# Patient Record
Sex: MALE | URBAN - METROPOLITAN AREA
[De-identification: names, ages, dates, MRNs, and addresses within clinical notes are randomized per-mention and may not be internally consistent; named-entity substitution may affect disease eponyms.]

---

## 2018-05-02 ENCOUNTER — APPOINTMENT (RX ONLY)
Dept: URBAN - METROPOLITAN AREA CLINIC 38 | Facility: CLINIC | Age: 58
Setting detail: DERMATOLOGY
End: 2018-05-02

## 2018-05-02 DIAGNOSIS — L20.89 OTHER ATOPIC DERMATITIS: ICD-10-CM

## 2018-05-02 DIAGNOSIS — L08.9 LOCAL INFECTION OF THE SKIN AND SUBCUTANEOUS TISSUE, UNSPECIFIED: ICD-10-CM

## 2018-05-02 DIAGNOSIS — L29.9 PRURITUS, UNSPECIFIED: ICD-10-CM

## 2018-05-02 PROBLEM — I10 ESSENTIAL (PRIMARY) HYPERTENSION: Status: ACTIVE | Noted: 2018-05-02

## 2018-05-02 PROBLEM — L20.84 INTRINSIC (ALLERGIC) ECZEMA: Status: ACTIVE | Noted: 2018-05-02

## 2018-05-02 PROBLEM — L85.3 XEROSIS CUTIS: Status: ACTIVE | Noted: 2018-05-02

## 2018-05-02 PROCEDURE — 99203 OFFICE O/P NEW LOW 30 MIN: CPT

## 2018-05-02 RX ORDER — MUPIROCIN 20 MG/G
OINTMENT TOPICAL
Qty: 22 | Refills: 0 | Status: ERX | COMMUNITY
Start: 2018-05-02

## 2018-05-02 RX ORDER — FLUOCINONIDE 0.5 MG/G
CREAM TOPICAL
Qty: 60 | Refills: 1 | Status: ERX | COMMUNITY
Start: 2018-05-02

## 2018-05-02 RX ADMIN — FLUOCINONIDE: 0.5 CREAM TOPICAL at 13:56

## 2018-05-02 RX ADMIN — MUPIROCIN: 20 OINTMENT TOPICAL at 13:54

## 2018-05-02 ASSESSMENT — SEVERITY ASSESSMENT: SEVERITY: MILD

## 2018-05-02 NOTE — HPI: FREE FORM (INITIAL HISTORY)
How Severe Is Your Skin Condition? (The Patient Describes The Severity Level As....): severe
What Brings You In Today? (This Is An Xx Year Old Patient Who Presents For...): Itching
When Did You First Notice It? (The Patient First Noticed It...): 4 months
Where On Your Body Is It? (Located On...): Left lower leg
Any Associated Symptoms? (The Symptoms Include.....): Itching and painful
What Previous Treatments Have You Tried? (The Patient Has Tried The Following Treatments...): Triamcinolone and clotrimazole/betamethasone- no help
Did Anything Happen To Make You Want To Come In For This Specifically Today? (The Specific Reason That The Patient Came In Today Was Because:): Evaluation and treatment

## 2020-11-12 ENCOUNTER — APPOINTMENT (OUTPATIENT)
Age: 60
Setting detail: DERMATOLOGY
End: 2020-11-12

## 2020-11-12 VITALS — TEMPERATURE: 97.9 F

## 2020-11-12 DIAGNOSIS — L72.8 OTHER FOLLICULAR CYSTS OF THE SKIN AND SUBCUTANEOUS TISSUE: ICD-10-CM

## 2020-11-12 DIAGNOSIS — L73.9 FOLLICULAR DISORDER, UNSPECIFIED: ICD-10-CM

## 2020-11-12 DIAGNOSIS — D485 NEOPLASM OF UNCERTAIN BEHAVIOR OF SKIN: ICD-10-CM

## 2020-11-12 DIAGNOSIS — R21 RASH AND OTHER NONSPECIFIC SKIN ERUPTION: ICD-10-CM

## 2020-11-12 DIAGNOSIS — L663 OTHER SPECIFIED DISEASES OF HAIR AND HAIR FOLLICLES: ICD-10-CM

## 2020-11-12 DIAGNOSIS — L738 OTHER SPECIFIED DISEASES OF HAIR AND HAIR FOLLICLES: ICD-10-CM

## 2020-11-12 PROBLEM — D48.5 NEOPLASM OF UNCERTAIN BEHAVIOR OF SKIN: Status: ACTIVE | Noted: 2020-11-12

## 2020-11-12 PROBLEM — L02.821 FURUNCLE OF HEAD [ANY PART, EXCEPT FACE]: Status: ACTIVE | Noted: 2020-11-12

## 2020-11-12 PROCEDURE — 11102 TANGNTL BX SKIN SINGLE LES: CPT

## 2020-11-12 PROCEDURE — 99203 OFFICE O/P NEW LOW 30 MIN: CPT | Mod: 25

## 2020-11-12 PROCEDURE — ? PRESCRIPTION

## 2020-11-12 PROCEDURE — ? DIAGNOSIS COMMENT

## 2020-11-12 PROCEDURE — ? COUNSELING

## 2020-11-12 PROCEDURE — ? PHE RELATED SUPPLIES PROVIDED/DISPENSED

## 2020-11-12 PROCEDURE — 99072 ADDL SUPL MATRL&STAF TM PHE: CPT

## 2020-11-12 PROCEDURE — ? TREATMENT REGIMEN

## 2020-11-12 PROCEDURE — ? BIOPSY BY SHAVE METHOD

## 2020-11-12 RX ORDER — BETAMETHASONE DIPROPIONATE 0.5 MG/ML
LOTION TOPICAL
Qty: 1 | Refills: 1 | Status: ERX | COMMUNITY
Start: 2020-11-12

## 2020-11-12 RX ORDER — HYDROCORTISONE 25 MG/G
CREAM TOPICAL
Qty: 1 | Refills: 1 | Status: ERX | COMMUNITY
Start: 2020-11-12

## 2020-11-12 RX ADMIN — HYDROCORTISONE: 25 CREAM TOPICAL at 00:00

## 2020-11-12 RX ADMIN — BETAMETHASONE DIPROPIONATE: 0.5 LOTION TOPICAL at 00:00

## 2020-11-12 ASSESSMENT — LOCATION DETAILED DESCRIPTION DERM
LOCATION DETAILED: MID-FRONTAL SCALP
LOCATION DETAILED: PERIANAL SKIN
LOCATION DETAILED: RIGHT POSTERIOR ANKLE
LOCATION DETAILED: RIGHT SUPERIOR MEDIAL UPPER BACK

## 2020-11-12 ASSESSMENT — LOCATION ZONE DERM
LOCATION ZONE: SCALP
LOCATION ZONE: LEG
LOCATION ZONE: ANUS
LOCATION ZONE: TRUNK

## 2020-11-12 ASSESSMENT — LOCATION SIMPLE DESCRIPTION DERM
LOCATION SIMPLE: ANTERIOR SCALP
LOCATION SIMPLE: RIGHT ANKLE
LOCATION SIMPLE: RIGHT UPPER BACK
LOCATION SIMPLE: PERIANAL SKIN

## 2020-11-12 NOTE — PROCEDURE: TREATMENT REGIMEN
Initiate Treatment: -Betamethasone Lotion twice a day for two weeks then twice a day M-F
Abdominal Pain, N/V/D
Initiate Treatment: -Hydrocortisone 2.5% cream twice a day for two weeks then twice a day M-F
Detail Level: Zone

## 2020-11-12 NOTE — PROCEDURE: DIAGNOSIS COMMENT
Comment: perforating dermatosis due to kidney disease vs other dermatosis\\ndid bx today to confirm dx
Comment: Recommended patient to see a general surgeon for further evaluation and treatment
Detail Level: Generalized

## 2020-11-12 NOTE — HPI: RASH
What Type Of Note Output Would You Prefer (Optional)?: Standard Output
Is The Patient Presenting As Previously Scheduled?: Yes
How Severe Is Your Rash?: moderate
Is This A New Presentation, Or A Follow-Up?: Rash
Additional History: Patient saw pcp and was advised to apply previously mentioned otc and hot compress

## 2020-11-12 NOTE — PROCEDURE: BIOPSY BY SHAVE METHOD
Render Path Notes In Note?: No
Electrodesiccation And Curettage Text: The wound bed was treated with electrodesiccation and curettage after the biopsy was performed.
Type Of Destruction Used: Curettage
Post-Care Instructions: I reviewed with the patient in detail post-care instructions. Patient is to keep the biopsy clean, and apply vaseline twice daily until healed.
Cryotherapy Text: The wound bed was treated with cryotherapy after the biopsy was performed.
Billing Type: Third-Party Bill
X Size Of Lesion In Cm: 0
Information: Selecting Yes will display possible errors in your note based on the variables you have selected. This validation is only offered as a suggestion for you. PLEASE NOTE THAT THE VALIDATION TEXT WILL BE REMOVED WHEN YOU FINALIZE YOUR NOTE. IF YOU WANT TO FAX A PRELIMINARY NOTE YOU WILL NEED TO TOGGLE THIS TO 'NO' IF YOU DO NOT WANT IT IN YOUR FAXED NOTE.
Render Post-Care Instructions In Note?: yes
Silver Nitrate Text: The wound bed was treated with silver nitrate after the biopsy was performed.
Curettage Text: The wound bed was treated with curettage after the biopsy was performed.
Notification Instructions: Patient will be notified of biopsy results. However, patient instructed to call the office if not contacted within 2-3 weeks.
Hemostasis: Aluminum Chloride and Electrocautery
Consent: Verbal consent was obtained and risks were reviewed including but not limited to pain, dyschromia, scarring, infection, bleeding, incomplete removal, recurrence, and additional procedures.
Dressing: bandage
Biopsy Type: H and E
Anesthesia Type: 2% lidocaine with epinephrine and a 1:10 solution of 8.4% sodium bicarbonate
Biopsy Method: Dermablade
Detail Level: Detailed
Electrodesiccation Text: The wound bed was treated with electrodesiccation after the biopsy was performed.
Depth Of Biopsy: dermis
Wound Care: Vaseline

## 2021-10-07 ENCOUNTER — APPOINTMENT (OUTPATIENT)
Age: 61
Setting detail: DERMATOLOGY
End: 2021-10-07

## 2021-10-07 DIAGNOSIS — L82.0 INFLAMED SEBORRHEIC KERATOSIS: ICD-10-CM

## 2021-10-07 DIAGNOSIS — L72.8 OTHER FOLLICULAR CYSTS OF THE SKIN AND SUBCUTANEOUS TISSUE: ICD-10-CM

## 2021-10-07 PROCEDURE — ? DIAGNOSIS COMMENT

## 2021-10-07 PROCEDURE — ? COUNSELING

## 2021-10-07 PROCEDURE — 99213 OFFICE O/P EST LOW 20 MIN: CPT | Mod: 25

## 2021-10-07 PROCEDURE — ? PRESCRIPTION MEDICATION MANAGEMENT

## 2021-10-07 PROCEDURE — ? PRESCRIPTION

## 2021-10-07 PROCEDURE — ? LIQUID NITROGEN

## 2021-10-07 PROCEDURE — 17110 DESTRUCTION B9 LES UP TO 14: CPT

## 2021-10-07 RX ORDER — MUPIROCIN 20 MG/G
OINTMENT TOPICAL BID
Qty: 22 | Refills: 1 | Status: ERX | COMMUNITY
Start: 2021-10-07

## 2021-10-07 RX ADMIN — MUPIROCIN: 20 OINTMENT TOPICAL at 00:00

## 2021-10-07 ASSESSMENT — LOCATION DETAILED DESCRIPTION DERM
LOCATION DETAILED: PERIANAL SKIN
LOCATION DETAILED: RIGHT LATERAL POSTERIOR ANKLE

## 2021-10-07 ASSESSMENT — LOCATION SIMPLE DESCRIPTION DERM
LOCATION SIMPLE: RIGHT ANKLE
LOCATION SIMPLE: PERIANAL SKIN

## 2021-10-07 ASSESSMENT — LOCATION ZONE DERM
LOCATION ZONE: ANUS
LOCATION ZONE: LEG

## 2021-10-07 NOTE — PROCEDURE: DIAGNOSIS COMMENT
Comment: subQ nodule. favor cyst, but ddx in perineum is large. if area worsens or doesn't improve, pt will call us or pcp
Detail Level: Generalized
Render Risk Assessment In Note?: no
Comment: DDX: Peripheral Dermatosis

## 2021-10-07 NOTE — PROCEDURE: PRESCRIPTION MEDICATION MANAGEMENT
Initiate Treatment: -Mupirocin ointment twice a day prn
Detail Level: Zone
Render In Strict Bullet Format?: No

## 2021-10-07 NOTE — PROCEDURE: LIQUID NITROGEN
Show Topical Anesthesia Variable?: Yes
Medical Necessity Information: It is in your best interest to select a reason for this procedure from the list below. All of these items fulfill various CMS LCD requirements except the new and changing color options.
Render Post-Care Instructions In Note?: no
Detail Level: Detailed
Medical Necessity Clause: This procedure was medically necessary because the lesions that were treated were:
Post-Care Instructions: I reviewed with the patient in detail post-care instructions. Patient is to wear sunprotection, and avoid picking at any of the treated lesions. Pt may apply Vaseline to crusted or scabbing areas.
Consent: The patient's consent was obtained including but not limited to risk of pain, bleeding, pigmentary change, infection, inadequate response, recurrence, additional procedures, and scarring.

## 2022-02-24 ENCOUNTER — PREPPED CHART (OUTPATIENT)
Dept: URBAN - METROPOLITAN AREA CLINIC 89 | Facility: CLINIC | Age: 62
End: 2022-02-24

## 2022-02-24 PROBLEM — E11.3511 DIABETIC MACULAR EDEMA: Noted: 2022-02-24

## 2022-02-24 PROBLEM — H35.033 HYPERTENSIVE RETINOPATHY: Noted: 2022-02-24

## 2022-02-24 PROBLEM — Z79.4 DIABETIC MACULAR EDEMA: Noted: 2022-02-24

## 2022-02-24 PROBLEM — H43.811 POSTERIOR VITREOUS DETACHMENT: Noted: 2022-02-24

## 2022-02-24 PROBLEM — Z79.4 SEVERE NONPROLIFERATIVE DIABETIC RETINOPATHY: Noted: 2022-02-24

## 2022-02-24 PROBLEM — E11.3413 SEVERE NONPROLIFERATIVE DIABETIC RETINOPATHY: Noted: 2022-02-24

## 2022-02-24 PROBLEM — E11.3412 SEVERE NONPROLIFERATIVE DIABETIC RETINOPATHY: Noted: 2022-02-24

## 2022-02-24 PROBLEM — E11.3413 DIABETIC MACULAR EDEMA: Noted: 2022-02-24

## 2022-02-24 PROBLEM — E11.3513 DIABETIC MACULAR EDEMA: Noted: 2022-02-24

## 2022-03-22 ASSESSMENT — VISUAL ACUITY
OS_SC: 20/40 -1
OD_PH: 20/40 +2
OD_SC: 20/40 -2

## 2022-03-22 ASSESSMENT — TONOMETRY
OS_IOP_MMHG: 14
OD_IOP_MMHG: 15

## 2022-03-28 ENCOUNTER — FOLLOW UP (OUTPATIENT)
Dept: URBAN - METROPOLITAN AREA CLINIC 89 | Facility: CLINIC | Age: 62
End: 2022-03-28

## 2022-03-28 DIAGNOSIS — H43.811: ICD-10-CM

## 2022-03-28 DIAGNOSIS — E11.3413: ICD-10-CM

## 2022-03-28 DIAGNOSIS — Z79.4: ICD-10-CM

## 2022-03-28 DIAGNOSIS — H35.033: ICD-10-CM

## 2022-03-28 PROCEDURE — 67028 INJECTION EYE DRUG: CPT | Mod: 50

## 2022-03-28 PROCEDURE — 92134 CPTRZ OPH DX IMG PST SGM RTA: CPT

## 2022-03-28 PROCEDURE — 92202 OPSCPY EXTND ON/MAC DRAW: CPT | Mod: NC

## 2022-03-28 PROCEDURE — 92014 COMPRE OPH EXAM EST PT 1/>: CPT | Mod: 25

## 2022-03-28 ASSESSMENT — VISUAL ACUITY
OS_SC: 20/40
OD_SC: 20/50-1

## 2022-03-28 ASSESSMENT — TONOMETRY
OD_IOP_MMHG: 13
OS_IOP_MMHG: 13

## 2022-04-25 ENCOUNTER — FOLLOW UP (OUTPATIENT)
Dept: URBAN - METROPOLITAN AREA CLINIC 89 | Facility: CLINIC | Age: 62
End: 2022-04-25

## 2022-04-25 DIAGNOSIS — Z79.4: ICD-10-CM

## 2022-04-25 DIAGNOSIS — H43.811: ICD-10-CM

## 2022-04-25 DIAGNOSIS — H35.033: ICD-10-CM

## 2022-04-25 DIAGNOSIS — E11.3413: ICD-10-CM

## 2022-04-25 PROCEDURE — 92014 COMPRE OPH EXAM EST PT 1/>: CPT | Mod: 25

## 2022-04-25 PROCEDURE — 67028 INJECTION EYE DRUG: CPT

## 2022-04-25 PROCEDURE — 92134 CPTRZ OPH DX IMG PST SGM RTA: CPT

## 2022-04-25 PROCEDURE — PFS EYLEA PFS

## 2022-04-25 PROCEDURE — 92202 OPSCPY EXTND ON/MAC DRAW: CPT | Mod: NC

## 2022-04-25 ASSESSMENT — TONOMETRY
OD_IOP_MMHG: 13
OS_IOP_MMHG: 14

## 2022-04-25 ASSESSMENT — VISUAL ACUITY
OD_SC: 20/40-2
OS_SC: 20/40

## 2022-05-23 ENCOUNTER — FOLLOW UP (OUTPATIENT)
Dept: URBAN - METROPOLITAN AREA CLINIC 89 | Facility: CLINIC | Age: 62
End: 2022-05-23

## 2022-05-23 DIAGNOSIS — H35.033: ICD-10-CM

## 2022-05-23 DIAGNOSIS — Z79.4: ICD-10-CM

## 2022-05-23 DIAGNOSIS — E11.3413: ICD-10-CM

## 2022-05-23 DIAGNOSIS — Z96.1: ICD-10-CM

## 2022-05-23 DIAGNOSIS — H43.811: ICD-10-CM

## 2022-05-23 PROCEDURE — 92202 OPSCPY EXTND ON/MAC DRAW: CPT | Mod: NC

## 2022-05-23 PROCEDURE — PFS EYLEA PFS

## 2022-05-23 PROCEDURE — 92014 COMPRE OPH EXAM EST PT 1/>: CPT | Mod: 25

## 2022-05-23 PROCEDURE — 92134 CPTRZ OPH DX IMG PST SGM RTA: CPT

## 2022-05-23 PROCEDURE — 67028 INJECTION EYE DRUG: CPT | Mod: 50

## 2022-05-23 ASSESSMENT — VISUAL ACUITY
OS_SC: 20/50-1
OD_SC: 20/40-1

## 2022-05-23 ASSESSMENT — TONOMETRY
OD_IOP_MMHG: 13
OS_IOP_MMHG: 14

## 2022-06-27 ENCOUNTER — FOLLOW UP (OUTPATIENT)
Dept: URBAN - METROPOLITAN AREA CLINIC 89 | Facility: CLINIC | Age: 62
End: 2022-06-27

## 2022-06-27 DIAGNOSIS — H43.811: ICD-10-CM

## 2022-06-27 DIAGNOSIS — H35.033: ICD-10-CM

## 2022-06-27 DIAGNOSIS — E11.3413: ICD-10-CM

## 2022-06-27 PROCEDURE — 92134 CPTRZ OPH DX IMG PST SGM RTA: CPT

## 2022-06-27 PROCEDURE — 92014 COMPRE OPH EXAM EST PT 1/>: CPT | Mod: 25

## 2022-06-27 PROCEDURE — 92202 OPSCPY EXTND ON/MAC DRAW: CPT | Mod: NC

## 2022-06-27 PROCEDURE — 67028 INJECTION EYE DRUG: CPT | Mod: 50

## 2022-06-27 ASSESSMENT — VISUAL ACUITY
OD_SC: 20/40
OS_SC: 20/50-2

## 2022-06-27 ASSESSMENT — TONOMETRY
OD_IOP_MMHG: 11
OS_IOP_MMHG: 9

## 2022-08-08 ENCOUNTER — FOLLOW UP (OUTPATIENT)
Dept: URBAN - METROPOLITAN AREA CLINIC 89 | Facility: CLINIC | Age: 62
End: 2022-08-08

## 2022-08-08 DIAGNOSIS — E11.3413: ICD-10-CM

## 2022-08-08 DIAGNOSIS — H35.033: ICD-10-CM

## 2022-08-08 DIAGNOSIS — H43.811: ICD-10-CM

## 2022-08-08 DIAGNOSIS — Z79.4: ICD-10-CM

## 2022-08-08 PROCEDURE — 92134 CPTRZ OPH DX IMG PST SGM RTA: CPT

## 2022-08-08 PROCEDURE — PFS EYLEA PFS

## 2022-08-08 PROCEDURE — 67028 INJECTION EYE DRUG: CPT | Mod: 50

## 2022-08-08 PROCEDURE — 92202 OPSCPY EXTND ON/MAC DRAW: CPT | Mod: NC

## 2022-08-08 PROCEDURE — 92014 COMPRE OPH EXAM EST PT 1/>: CPT | Mod: 25

## 2022-08-08 ASSESSMENT — TONOMETRY
OD_IOP_MMHG: 17
OS_IOP_MMHG: 17

## 2022-08-08 ASSESSMENT — VISUAL ACUITY
OS_SC: 20/50
OD_SC: 20/40

## 2022-09-07 ENCOUNTER — FOLLOW UP (OUTPATIENT)
Dept: URBAN - METROPOLITAN AREA CLINIC 89 | Facility: CLINIC | Age: 62
End: 2022-09-07

## 2022-09-07 DIAGNOSIS — H43.811: ICD-10-CM

## 2022-09-07 DIAGNOSIS — E11.3413: ICD-10-CM

## 2022-09-07 DIAGNOSIS — H35.033: ICD-10-CM

## 2022-09-07 DIAGNOSIS — Z79.4: ICD-10-CM

## 2022-09-07 PROCEDURE — 92014 COMPRE OPH EXAM EST PT 1/>: CPT | Mod: 25

## 2022-09-07 PROCEDURE — PFS EYLEA PFS

## 2022-09-07 PROCEDURE — 92134 CPTRZ OPH DX IMG PST SGM RTA: CPT

## 2022-09-07 PROCEDURE — 67028 INJECTION EYE DRUG: CPT | Mod: 50

## 2022-09-07 PROCEDURE — 92202 OPSCPY EXTND ON/MAC DRAW: CPT | Mod: NC

## 2022-09-07 ASSESSMENT — TONOMETRY
OS_IOP_MMHG: 22
OD_IOP_MMHG: 15

## 2022-09-07 ASSESSMENT — VISUAL ACUITY
OS_SC: 20/40-2
OD_SC: 20/40-2

## 2022-09-27 ENCOUNTER — APPOINTMENT (RX ONLY)
Dept: URBAN - METROPOLITAN AREA CLINIC 38 | Facility: CLINIC | Age: 62
Setting detail: DERMATOLOGY
End: 2022-09-27

## 2022-09-27 DIAGNOSIS — L87.2 ELASTOSIS PERFORANS SERPIGINOSA: ICD-10-CM | Status: WORSENING

## 2022-09-27 PROCEDURE — ? ADDITIONAL NOTES

## 2022-09-27 PROCEDURE — 99204 OFFICE O/P NEW MOD 45 MIN: CPT

## 2022-09-27 PROCEDURE — ? COUNSELING

## 2022-09-27 PROCEDURE — ? PRESCRIPTION

## 2022-09-27 RX ORDER — DESOXIMETASONE 2.5 MG/G
CREAM TOPICAL BID
Qty: 60 | Refills: 3 | Status: ERX | COMMUNITY
Start: 2022-09-27

## 2022-09-27 RX ADMIN — DESOXIMETASONE: 2.5 CREAM TOPICAL at 00:00

## 2022-09-27 ASSESSMENT — LOCATION ZONE DERM: LOCATION ZONE: LEG

## 2022-09-27 ASSESSMENT — LOCATION SIMPLE DESCRIPTION DERM
LOCATION SIMPLE: RIGHT PRETIBIAL REGION
LOCATION SIMPLE: LEFT PRETIBIAL REGION

## 2022-09-27 ASSESSMENT — LOCATION DETAILED DESCRIPTION DERM
LOCATION DETAILED: RIGHT DISTAL PRETIBIAL REGION
LOCATION DETAILED: LEFT DISTAL PRETIBIAL REGION

## 2022-10-13 ENCOUNTER — FOLLOW UP (OUTPATIENT)
Dept: URBAN - METROPOLITAN AREA CLINIC 89 | Facility: CLINIC | Age: 62
End: 2022-10-13

## 2022-10-13 DIAGNOSIS — Z79.4: ICD-10-CM

## 2022-10-13 DIAGNOSIS — H35.033: ICD-10-CM

## 2022-10-13 DIAGNOSIS — E11.3413: ICD-10-CM

## 2022-10-13 DIAGNOSIS — H43.811: ICD-10-CM

## 2022-10-13 PROCEDURE — 92014 COMPRE OPH EXAM EST PT 1/>: CPT | Mod: 25

## 2022-10-13 PROCEDURE — 92134 CPTRZ OPH DX IMG PST SGM RTA: CPT

## 2022-10-13 PROCEDURE — 92202 OPSCPY EXTND ON/MAC DRAW: CPT | Mod: NC

## 2022-10-13 PROCEDURE — 67028 INJECTION EYE DRUG: CPT | Mod: 50

## 2022-10-13 ASSESSMENT — TONOMETRY
OS_IOP_MMHG: 11
OD_IOP_MMHG: 13

## 2022-10-13 ASSESSMENT — VISUAL ACUITY
OS_SC: 20/40-1
OD_SC: 20/30-2
OD_PH: 20/25-2

## 2022-10-26 ENCOUNTER — APPOINTMENT (RX ONLY)
Dept: URBAN - METROPOLITAN AREA CLINIC 38 | Facility: CLINIC | Age: 62
Setting detail: DERMATOLOGY
End: 2022-10-26

## 2022-10-26 DIAGNOSIS — Q80.0 ICHTHYOSIS VULGARIS: ICD-10-CM

## 2022-10-26 PROCEDURE — ? PRESCRIPTION

## 2022-10-26 PROCEDURE — 99213 OFFICE O/P EST LOW 20 MIN: CPT

## 2022-10-26 PROCEDURE — ? ADDITIONAL NOTES

## 2022-10-26 PROCEDURE — ? COUNSELING

## 2022-10-26 RX ORDER — AMMONIUM LACTATE 12 G/100G
CREAM TOPICAL
Qty: 1 | Refills: 3 | Status: ERX | COMMUNITY
Start: 2022-10-26

## 2022-10-26 RX ADMIN — AMMONIUM LACTATE: 12 CREAM TOPICAL at 00:00

## 2022-10-26 ASSESSMENT — LOCATION DETAILED DESCRIPTION DERM
LOCATION DETAILED: LEFT DISTAL PRETIBIAL REGION
LOCATION DETAILED: RIGHT DISTAL PRETIBIAL REGION

## 2022-10-26 ASSESSMENT — LOCATION SIMPLE DESCRIPTION DERM
LOCATION SIMPLE: LEFT PRETIBIAL REGION
LOCATION SIMPLE: RIGHT PRETIBIAL REGION

## 2022-10-26 ASSESSMENT — LOCATION ZONE DERM: LOCATION ZONE: LEG

## 2022-11-17 ENCOUNTER — FOLLOW UP (OUTPATIENT)
Dept: URBAN - METROPOLITAN AREA CLINIC 89 | Facility: CLINIC | Age: 62
End: 2022-11-17

## 2022-11-17 DIAGNOSIS — Z79.4: ICD-10-CM

## 2022-11-17 DIAGNOSIS — H35.033: ICD-10-CM

## 2022-11-17 DIAGNOSIS — H43.811: ICD-10-CM

## 2022-11-17 DIAGNOSIS — E11.3413: ICD-10-CM

## 2022-11-17 PROCEDURE — 92014 COMPRE OPH EXAM EST PT 1/>: CPT | Mod: 25

## 2022-11-17 PROCEDURE — 92134 CPTRZ OPH DX IMG PST SGM RTA: CPT

## 2022-11-17 PROCEDURE — PFS EYLEA PFS

## 2022-11-17 PROCEDURE — 92202 OPSCPY EXTND ON/MAC DRAW: CPT | Mod: NC

## 2022-11-17 PROCEDURE — 67028 INJECTION EYE DRUG: CPT | Mod: 50

## 2022-11-17 ASSESSMENT — TONOMETRY
OS_IOP_MMHG: 11
OD_IOP_MMHG: 13

## 2022-11-17 ASSESSMENT — VISUAL ACUITY
OD_SC: 20/40+1
OS_SC: 20/40+3

## 2022-12-15 ENCOUNTER — FOLLOW UP (OUTPATIENT)
Dept: URBAN - METROPOLITAN AREA CLINIC 89 | Facility: CLINIC | Age: 62
End: 2022-12-15

## 2022-12-15 DIAGNOSIS — H43.811: ICD-10-CM

## 2022-12-15 DIAGNOSIS — E11.3413: ICD-10-CM

## 2022-12-15 DIAGNOSIS — Z79.4: ICD-10-CM

## 2022-12-15 DIAGNOSIS — Z96.1: ICD-10-CM

## 2022-12-15 DIAGNOSIS — H35.033: ICD-10-CM

## 2022-12-15 PROCEDURE — 92014 COMPRE OPH EXAM EST PT 1/>: CPT

## 2022-12-15 PROCEDURE — 92134 CPTRZ OPH DX IMG PST SGM RTA: CPT

## 2022-12-15 PROCEDURE — 92202 OPSCPY EXTND ON/MAC DRAW: CPT

## 2022-12-15 ASSESSMENT — VISUAL ACUITY
OS_SC: 20/40-1
OD_PH: 20/40
OD_SC: 20/40-2

## 2022-12-15 ASSESSMENT — TONOMETRY
OS_IOP_MMHG: 17
OD_IOP_MMHG: 17

## 2023-01-12 ENCOUNTER — FOLLOW UP (OUTPATIENT)
Dept: URBAN - METROPOLITAN AREA CLINIC 89 | Facility: CLINIC | Age: 63
End: 2023-01-12

## 2023-01-12 DIAGNOSIS — H35.033: ICD-10-CM

## 2023-01-12 DIAGNOSIS — Z79.4: ICD-10-CM

## 2023-01-12 DIAGNOSIS — E11.3413: ICD-10-CM

## 2023-01-12 PROCEDURE — 92014 COMPRE OPH EXAM EST PT 1/>: CPT

## 2023-01-12 PROCEDURE — 92134 CPTRZ OPH DX IMG PST SGM RTA: CPT

## 2023-01-12 PROCEDURE — 92201 OPSCPY EXTND RTA DRAW UNI/BI: CPT

## 2023-01-12 ASSESSMENT — TONOMETRY
OS_IOP_MMHG: 13
OD_IOP_MMHG: 15

## 2023-01-12 ASSESSMENT — VISUAL ACUITY
OS_PH: 20/30
OS_SC: 20/40-1
OD_SC: 20/60-2
OD_PH: 20/30-1

## 2023-02-22 ENCOUNTER — FOLLOW UP (OUTPATIENT)
Dept: URBAN - METROPOLITAN AREA CLINIC 89 | Facility: CLINIC | Age: 63
End: 2023-02-22

## 2023-02-22 DIAGNOSIS — H43.811: ICD-10-CM

## 2023-02-22 DIAGNOSIS — H35.033: ICD-10-CM

## 2023-02-22 DIAGNOSIS — E11.3413: ICD-10-CM

## 2023-02-22 PROCEDURE — 92134 CPTRZ OPH DX IMG PST SGM RTA: CPT

## 2023-02-22 PROCEDURE — 92014 COMPRE OPH EXAM EST PT 1/>: CPT

## 2023-02-22 PROCEDURE — 92202 OPSCPY EXTND ON/MAC DRAW: CPT

## 2023-02-22 ASSESSMENT — VISUAL ACUITY
OD_SC: 20/70
OS_SC: 20/50-2

## 2023-02-22 ASSESSMENT — TONOMETRY
OS_IOP_MMHG: 8
OD_IOP_MMHG: 10

## 2023-05-31 ENCOUNTER — FOLLOW UP (OUTPATIENT)
Dept: URBAN - METROPOLITAN AREA CLINIC 89 | Facility: CLINIC | Age: 63
End: 2023-05-31

## 2023-05-31 DIAGNOSIS — H43.811: ICD-10-CM

## 2023-05-31 DIAGNOSIS — H40.89: ICD-10-CM

## 2023-05-31 DIAGNOSIS — H35.033: ICD-10-CM

## 2023-05-31 DIAGNOSIS — H43.11: ICD-10-CM

## 2023-05-31 DIAGNOSIS — E11.3412: ICD-10-CM

## 2023-05-31 DIAGNOSIS — E11.3511: ICD-10-CM

## 2023-05-31 DIAGNOSIS — Z79.4: ICD-10-CM

## 2023-05-31 PROCEDURE — 92014 COMPRE OPH EXAM EST PT 1/>: CPT | Mod: 25

## 2023-05-31 PROCEDURE — 67028 INJECTION EYE DRUG: CPT | Mod: 50

## 2023-05-31 PROCEDURE — 92202 OPSCPY EXTND ON/MAC DRAW: CPT | Mod: NC

## 2023-05-31 PROCEDURE — 92134 CPTRZ OPH DX IMG PST SGM RTA: CPT

## 2023-05-31 PROCEDURE — PFS EYLEA PFS

## 2023-05-31 PROCEDURE — 76512 OPH US DX B-SCAN: CPT

## 2023-05-31 ASSESSMENT — TONOMETRY
OS_IOP_MMHG: 15
OD_IOP_MMHG: 32

## 2023-05-31 ASSESSMENT — VISUAL ACUITY: OS_SC: 20/60

## 2023-06-07 ENCOUNTER — FOLLOW UP (OUTPATIENT)
Dept: URBAN - METROPOLITAN AREA CLINIC 89 | Facility: CLINIC | Age: 63
End: 2023-06-07

## 2023-06-07 DIAGNOSIS — H43.811: ICD-10-CM

## 2023-06-07 DIAGNOSIS — Z79.4: ICD-10-CM

## 2023-06-07 DIAGNOSIS — E11.3412: ICD-10-CM

## 2023-06-07 DIAGNOSIS — E11.3511: ICD-10-CM

## 2023-06-07 DIAGNOSIS — H43.11: ICD-10-CM

## 2023-06-07 DIAGNOSIS — H40.89: ICD-10-CM

## 2023-06-07 DIAGNOSIS — H35.033: ICD-10-CM

## 2023-06-07 PROCEDURE — 92134 CPTRZ OPH DX IMG PST SGM RTA: CPT

## 2023-06-07 PROCEDURE — 92014 COMPRE OPH EXAM EST PT 1/>: CPT

## 2023-06-07 PROCEDURE — 92202 OPSCPY EXTND ON/MAC DRAW: CPT

## 2023-06-07 ASSESSMENT — VISUAL ACUITY: OS_SC: 20/40-1

## 2023-06-07 ASSESSMENT — TONOMETRY
OS_IOP_MMHG: 10
OD_IOP_MMHG: 23

## 2023-09-13 ENCOUNTER — FOLLOW UP (OUTPATIENT)
Dept: URBAN - METROPOLITAN AREA CLINIC 89 | Facility: CLINIC | Age: 63
End: 2023-09-13

## 2023-09-13 DIAGNOSIS — E11.3511: ICD-10-CM

## 2023-09-13 DIAGNOSIS — E11.3412: ICD-10-CM

## 2023-09-13 DIAGNOSIS — H35.033: ICD-10-CM

## 2023-09-13 DIAGNOSIS — H34.8110: ICD-10-CM

## 2023-09-13 DIAGNOSIS — H43.811: ICD-10-CM

## 2023-09-13 DIAGNOSIS — H43.11: ICD-10-CM

## 2023-09-13 DIAGNOSIS — H40.89: ICD-10-CM

## 2023-09-13 DIAGNOSIS — Z79.4: ICD-10-CM

## 2023-09-13 PROCEDURE — 92202 OPSCPY EXTND ON/MAC DRAW: CPT | Mod: NC

## 2023-09-13 PROCEDURE — 92134 CPTRZ OPH DX IMG PST SGM RTA: CPT

## 2023-09-13 PROCEDURE — 67028 INJECTION EYE DRUG: CPT | Mod: 50

## 2023-09-13 PROCEDURE — PFS EYLEA PFS: Mod: JZ

## 2023-09-13 PROCEDURE — 92014 COMPRE OPH EXAM EST PT 1/>: CPT | Mod: 25

## 2023-09-13 ASSESSMENT — TONOMETRY
OD_IOP_MMHG: 14
OS_IOP_MMHG: 13

## 2023-09-13 ASSESSMENT — VISUAL ACUITY: OS_SC: 20/60-2

## 2023-10-11 ENCOUNTER — FOLLOW UP (OUTPATIENT)
Dept: URBAN - METROPOLITAN AREA CLINIC 89 | Facility: CLINIC | Age: 63
End: 2023-10-11

## 2023-10-11 DIAGNOSIS — E11.3412: ICD-10-CM

## 2023-10-11 DIAGNOSIS — H35.033: ICD-10-CM

## 2023-10-11 DIAGNOSIS — H34.8110: ICD-10-CM

## 2023-10-11 DIAGNOSIS — H40.89: ICD-10-CM

## 2023-10-11 DIAGNOSIS — E11.3511: ICD-10-CM

## 2023-10-11 DIAGNOSIS — H43.811: ICD-10-CM

## 2023-10-11 DIAGNOSIS — Z79.4: ICD-10-CM

## 2023-10-11 DIAGNOSIS — H43.11: ICD-10-CM

## 2023-10-11 PROCEDURE — 92134 CPTRZ OPH DX IMG PST SGM RTA: CPT

## 2023-10-11 PROCEDURE — HD EYLEA HD 8MG: Mod: JZ

## 2023-10-11 PROCEDURE — 92014 COMPRE OPH EXAM EST PT 1/>: CPT | Mod: 25

## 2023-10-11 PROCEDURE — 67028 INJECTION EYE DRUG: CPT | Mod: 50

## 2023-10-11 PROCEDURE — 92202 OPSCPY EXTND ON/MAC DRAW: CPT | Mod: NC

## 2023-10-11 ASSESSMENT — VISUAL ACUITY
OS_SC: 20/60+1
OS_PH: 20/50-2
OD_SC: 20/150-1

## 2023-10-11 ASSESSMENT — TONOMETRY
OD_IOP_MMHG: 15
OS_IOP_MMHG: 11

## 2023-11-16 ENCOUNTER — FOLLOW UP (OUTPATIENT)
Dept: URBAN - METROPOLITAN AREA CLINIC 89 | Facility: CLINIC | Age: 63
End: 2023-11-16

## 2023-11-16 DIAGNOSIS — H40.89: ICD-10-CM

## 2023-11-16 DIAGNOSIS — Z79.4: ICD-10-CM

## 2023-11-16 DIAGNOSIS — H43.811: ICD-10-CM

## 2023-11-16 DIAGNOSIS — H34.8110: ICD-10-CM

## 2023-11-16 DIAGNOSIS — E11.3412: ICD-10-CM

## 2023-11-16 DIAGNOSIS — H43.11: ICD-10-CM

## 2023-11-16 DIAGNOSIS — E11.3511: ICD-10-CM

## 2023-11-16 DIAGNOSIS — H35.033: ICD-10-CM

## 2023-11-16 PROCEDURE — 92134 CPTRZ OPH DX IMG PST SGM RTA: CPT

## 2023-11-16 PROCEDURE — 92202 OPSCPY EXTND ON/MAC DRAW: CPT | Mod: NC

## 2023-11-16 PROCEDURE — 92014 COMPRE OPH EXAM EST PT 1/>: CPT | Mod: 25

## 2023-11-16 PROCEDURE — HD EYLEA HD 8MG: Mod: JZ

## 2023-11-16 PROCEDURE — 67028 INJECTION EYE DRUG: CPT | Mod: 50

## 2023-11-16 ASSESSMENT — TONOMETRY
OD_IOP_MMHG: 13
OS_IOP_MMHG: 12

## 2023-11-16 ASSESSMENT — VISUAL ACUITY
OD_SC: 20/100
OS_SC: 20/50-1

## 2023-12-14 ENCOUNTER — FOLLOW UP (OUTPATIENT)
Dept: URBAN - METROPOLITAN AREA CLINIC 89 | Facility: CLINIC | Age: 63
End: 2023-12-14

## 2023-12-14 DIAGNOSIS — Z79.4: ICD-10-CM

## 2023-12-14 DIAGNOSIS — E11.3511: ICD-10-CM

## 2023-12-14 DIAGNOSIS — E11.3412: ICD-10-CM

## 2023-12-14 DIAGNOSIS — H35.033: ICD-10-CM

## 2023-12-14 DIAGNOSIS — H34.8110: ICD-10-CM

## 2023-12-14 DIAGNOSIS — H43.811: ICD-10-CM

## 2023-12-14 DIAGNOSIS — H43.11: ICD-10-CM

## 2023-12-14 PROCEDURE — 92014 COMPRE OPH EXAM EST PT 1/>: CPT | Mod: 25

## 2023-12-14 PROCEDURE — 92134 CPTRZ OPH DX IMG PST SGM RTA: CPT

## 2023-12-14 PROCEDURE — HD EYLEA HD 8MG: Mod: JZ

## 2023-12-14 PROCEDURE — 92202 OPSCPY EXTND ON/MAC DRAW: CPT | Mod: NC

## 2023-12-14 PROCEDURE — 67028 INJECTION EYE DRUG: CPT | Mod: 50

## 2023-12-14 ASSESSMENT — TONOMETRY
OS_IOP_MMHG: 10
OD_IOP_MMHG: 12

## 2023-12-14 ASSESSMENT — VISUAL ACUITY
OS_SC: 20/60-2
OD_SC: 20/100

## 2024-01-17 ENCOUNTER — FOLLOW UP (OUTPATIENT)
Dept: URBAN - METROPOLITAN AREA CLINIC 89 | Facility: CLINIC | Age: 64
End: 2024-01-17

## 2024-01-17 DIAGNOSIS — H43.11: ICD-10-CM

## 2024-01-17 DIAGNOSIS — H43.811: ICD-10-CM

## 2024-01-17 DIAGNOSIS — Z79.4: ICD-10-CM

## 2024-01-17 DIAGNOSIS — H40.89: ICD-10-CM

## 2024-01-17 DIAGNOSIS — E11.3511: ICD-10-CM

## 2024-01-17 DIAGNOSIS — E11.3412: ICD-10-CM

## 2024-01-17 DIAGNOSIS — H35.033: ICD-10-CM

## 2024-01-17 DIAGNOSIS — H34.8110: ICD-10-CM

## 2024-01-17 PROCEDURE — 92202 OPSCPY EXTND ON/MAC DRAW: CPT | Mod: NC

## 2024-01-17 PROCEDURE — 92014 COMPRE OPH EXAM EST PT 1/>: CPT | Mod: 25

## 2024-01-17 PROCEDURE — 92134 CPTRZ OPH DX IMG PST SGM RTA: CPT

## 2024-01-17 PROCEDURE — 67028 INJECTION EYE DRUG: CPT | Mod: 50

## 2024-01-17 PROCEDURE — HD EYLEA HD 8MG: Mod: JZ

## 2024-01-17 ASSESSMENT — VISUAL ACUITY
OS_SC: 20/60-2
OD_SC: 20/100-1

## 2024-01-17 ASSESSMENT — TONOMETRY
OD_IOP_MMHG: 10
OS_IOP_MMHG: 11

## 2024-04-25 ENCOUNTER — FOLLOW UP (OUTPATIENT)
Dept: URBAN - METROPOLITAN AREA CLINIC 89 | Facility: CLINIC | Age: 64
End: 2024-04-25

## 2024-04-25 DIAGNOSIS — Z79.4: ICD-10-CM

## 2024-04-25 DIAGNOSIS — H43.811: ICD-10-CM

## 2024-04-25 DIAGNOSIS — E11.3412: ICD-10-CM

## 2024-04-25 DIAGNOSIS — H40.89: ICD-10-CM

## 2024-04-25 DIAGNOSIS — H34.8110: ICD-10-CM

## 2024-04-25 DIAGNOSIS — H43.11: ICD-10-CM

## 2024-04-25 DIAGNOSIS — E11.3511: ICD-10-CM

## 2024-04-25 DIAGNOSIS — H35.033: ICD-10-CM

## 2024-04-25 PROCEDURE — 92014 COMPRE OPH EXAM EST PT 1/>: CPT

## 2024-04-25 PROCEDURE — 92134 CPTRZ OPH DX IMG PST SGM RTA: CPT

## 2024-04-25 PROCEDURE — 92202 OPSCPY EXTND ON/MAC DRAW: CPT

## 2024-04-25 ASSESSMENT — TONOMETRY
OS_IOP_MMHG: 8
OD_IOP_MMHG: 10

## 2024-04-25 ASSESSMENT — VISUAL ACUITY
OS_SC: 20/50
OD_SC: 20/150-1

## 2024-07-26 ENCOUNTER — FOLLOW UP (OUTPATIENT)
Dept: URBAN - METROPOLITAN AREA CLINIC 101 | Facility: CLINIC | Age: 64
End: 2024-07-26

## 2024-07-26 DIAGNOSIS — E11.3513: ICD-10-CM

## 2024-07-26 DIAGNOSIS — Z79.4: ICD-10-CM

## 2024-07-26 DIAGNOSIS — H43.811: ICD-10-CM

## 2024-07-26 DIAGNOSIS — H43.11: ICD-10-CM

## 2024-07-26 DIAGNOSIS — H40.89: ICD-10-CM

## 2024-07-26 DIAGNOSIS — H34.8110: ICD-10-CM

## 2024-07-26 DIAGNOSIS — H35.033: ICD-10-CM

## 2024-07-26 PROCEDURE — 92014 COMPRE OPH EXAM EST PT 1/>: CPT | Mod: 25

## 2024-07-26 PROCEDURE — 92134 CPTRZ OPH DX IMG PST SGM RTA: CPT

## 2024-07-26 PROCEDURE — 67028 INJECTION EYE DRUG: CPT | Mod: 50

## 2024-07-26 ASSESSMENT — VISUAL ACUITY: OS_SC: 20/70

## 2024-07-26 ASSESSMENT — TONOMETRY
OS_IOP_MMHG: 10
OD_IOP_MMHG: 21

## 2024-12-02 ENCOUNTER — FOLLOW UP (OUTPATIENT)
Dept: URBAN - METROPOLITAN AREA CLINIC 101 | Facility: CLINIC | Age: 64
End: 2024-12-02

## 2024-12-02 DIAGNOSIS — H35.033: ICD-10-CM

## 2024-12-02 DIAGNOSIS — Z79.4: ICD-10-CM

## 2024-12-02 DIAGNOSIS — H43.11: ICD-10-CM

## 2024-12-02 DIAGNOSIS — H43.811: ICD-10-CM

## 2024-12-02 DIAGNOSIS — H34.8110: ICD-10-CM

## 2024-12-02 DIAGNOSIS — H40.89: ICD-10-CM

## 2024-12-02 DIAGNOSIS — E11.3513: ICD-10-CM

## 2024-12-02 PROCEDURE — 92134 CPTRZ OPH DX IMG PST SGM RTA: CPT

## 2024-12-02 PROCEDURE — 92202 OPSCPY EXTND ON/MAC DRAW: CPT | Mod: 59

## 2024-12-02 PROCEDURE — 67028 INJECTION EYE DRUG: CPT

## 2024-12-02 PROCEDURE — 92014 COMPRE OPH EXAM EST PT 1/>: CPT | Mod: 25

## 2024-12-02 ASSESSMENT — TONOMETRY
OD_IOP_MMHG: 17
OS_IOP_MMHG: 10

## 2024-12-02 ASSESSMENT — VISUAL ACUITY
OS_PH: 20/60-3
OS_SC: 20/70-1

## (undated) RX ORDER — DORZOLAMIDE 20 MG/ML: 1 SOLUTION/ DROPS OPHTHALMIC